# Patient Record
Sex: MALE | Race: WHITE | HISPANIC OR LATINO | Employment: FULL TIME | URBAN - METROPOLITAN AREA
[De-identification: names, ages, dates, MRNs, and addresses within clinical notes are randomized per-mention and may not be internally consistent; named-entity substitution may affect disease eponyms.]

---

## 2024-06-27 ENCOUNTER — OFFICE VISIT (OUTPATIENT)
Dept: CARDIOLOGY | Facility: CLINIC | Age: 39
End: 2024-06-27
Payer: COMMERCIAL

## 2024-06-27 VITALS
BODY MASS INDEX: 26.58 KG/M2 | SYSTOLIC BLOOD PRESSURE: 122 MMHG | DIASTOLIC BLOOD PRESSURE: 84 MMHG | WEIGHT: 175.4 LBS | HEART RATE: 72 BPM | HEIGHT: 68 IN

## 2024-06-27 DIAGNOSIS — Z86.79 HISTORY OF CARDIOMYOPATHY: ICD-10-CM

## 2024-06-27 DIAGNOSIS — Z13.220 SCREENING FOR HYPERLIPIDEMIA: ICD-10-CM

## 2024-06-27 DIAGNOSIS — Z86.79 HISTORY OF HYPERTENSION: Primary | ICD-10-CM

## 2024-06-27 PROCEDURE — 93000 ELECTROCARDIOGRAM COMPLETE: CPT | Performed by: INTERNAL MEDICINE

## 2024-06-27 PROCEDURE — 99205 OFFICE O/P NEW HI 60 MIN: CPT | Performed by: INTERNAL MEDICINE

## 2024-06-27 RX ORDER — LOSARTAN POTASSIUM 50 MG/1
50 TABLET ORAL DAILY
COMMUNITY
End: 2024-06-27

## 2024-06-27 NOTE — PROGRESS NOTES
SERVICE DATE: June 27, 2024    REFERRING PROVIDER:  No referring provider defined for this encounter.    PRIMARY CARE PROVIDER:  No primary care provider on file.  No primary provider on file.    REASON FOR VISIT:  History of hypertension and history of cardiomyopathy at age 26 years in [facility].    HISTORY OF PRESENT ILLNESS:  Guillermo Reyes Diaz is a Croatian-speaking 39 year old male, accompanied by his wife today.  Consultation was via a formal .  Patient is originally from [facility], has been in the [place] for [place], previously did not have health insurance, recently obtained health insurance and is here to establish care due to a prior cardiac history.  He does not currently have a primary care provider.    He says that when he was in [facility], at age 26 years, he was diagnosed with congestive heart failure and hypertension.  He was morbidly obese at the time.  Over the last few years, he has gone on the keto diet (high in saturated fat and salt per patient), has lost close to 60 pounds in weight, and has been asymptomatic for several years.  No chest pain, shortness of breath, lower extremity edema.  Works out regularly.  He has not been taking his losartan 50 mg for 2 months.  No tobacco use.  Family history is pertinent for hypertension and hyperlipidemia in his father.  No family history of coronary artery disease or cardiomyopathy as far as the patient knows but his family members do not get regular medical care.    Blood pressure today is excellent at 122/84, pulse of 72 bpm.  He weighs 175 pounds, BMI is 26.  Cardiac auscultation is normal with regular heart sounds, normal apical impulse, normal JVP, no murmur, lungs are clear, no lower extremity edema.    ECG done today shows normal sinus rhythm, normal cardiac intervals, QTc 405 ms, no left ventricular hypertrophy by voltage criteria, no ischemia or infarct.    Patient has no labs or cardiac imaging in the EMR either  here or in Care Everywhere.  He has not seen any provider in the [place] for 3 years.    DIAGNOSES/ASSESSMENT:  History of hypertension with normalization of blood pressure after significant weight loss and correction of morbid obesity through lifestyle modification by patient.  He is normotensive today.  No indication for him to take losartan.  He will need follow-up testing.  History of left ventricular systolic cardiomyopathy diagnosed in [facility] at the time of untreated hypertension at age 26 years.  Needs up-to-date echocardiogram and labs.  Family history of hyperlipidemia.  Needs screening lipid panel.    PLAN:  Detailed discussion via  with patient.  Reiterated the importance of dietary modification to decrease saturated fat and sodium which is currently part of his keto type diet which she has been doing for a few years.  I talked him about the DASH diet which entails increase natural foods like fruit and vegetables, whole grains, low-fat dairy, minimizing alcohol, drinking adequate water, 1-2 servings of caffeine daily.  Transthoracic echocardiogram.  Fasting labs (lipid panel, CBC, CMP) and follow-up with cardiology DAMARIS.  I have done a referral to primary care and urged him to establish care.  If his cardiac testing is unremarkable, he will be discharged from the cardiology clinic.      Blas Ibarra MD      New patient.  60 minutes spent by me on the date of the encounter doing chart review, history and exam, documentation and further activities per the note      The longitudinal plan of care for the condition(s) below were addressed during this visit. Due to the added complexity in care, I will continue to support Pernell in the subsequent management of this condition(s) and with the ongoing continuity of care of this condition(s).    Problem List Items Addressed This Visit as of 6/27/2024   History of hypertension and cardiomyopathy.       This note was completed in part  "using dictation via the Dragon voice recognition software. Some word and grammatical errors may occur and must be interpreted in the appropriate clinical context. If there are any questions pertaining to this issue, please contact me for further clarification.       Vitals: /84   Pulse 72   Ht 1.727 m (5' 8\")   Wt 79.6 kg (175 lb 6.4 oz)   BMI 26.67 kg/m    Wt Readings from Last 5 Encounters:   06/27/24 79.6 kg (175 lb 6.4 oz)         Orders Placed This Encounter   Procedures    Comprehensive metabolic panel    Lipid Profile    Primary Care Referral    Follow-Up with Cardiology DAMARIS    EKG 12-lead complete w/read - Clinics (performed today)    Echocardiogram Complete    CBC with platelets differential           CURRENT MEDICATIONS:  No current outpatient medications on file.         ALLERGIES:  No Known Allergies  "

## 2024-08-22 SDOH — HEALTH STABILITY: PHYSICAL HEALTH: ON AVERAGE, HOW MANY DAYS PER WEEK DO YOU ENGAGE IN MODERATE TO STRENUOUS EXERCISE (LIKE A BRISK WALK)?: 3 DAYS

## 2024-08-22 SDOH — HEALTH STABILITY: PHYSICAL HEALTH: ON AVERAGE, HOW MANY MINUTES DO YOU ENGAGE IN EXERCISE AT THIS LEVEL?: 60 MIN

## 2024-08-22 ASSESSMENT — SOCIAL DETERMINANTS OF HEALTH (SDOH): HOW OFTEN DO YOU GET TOGETHER WITH FRIENDS OR RELATIVES?: MORE THAN THREE TIMES A WEEK

## 2024-08-27 ENCOUNTER — OFFICE VISIT (OUTPATIENT)
Dept: PEDIATRICS | Facility: CLINIC | Age: 39
End: 2024-08-27
Payer: COMMERCIAL

## 2024-08-27 VITALS
BODY MASS INDEX: 26.69 KG/M2 | WEIGHT: 176.1 LBS | TEMPERATURE: 97.2 F | HEART RATE: 70 BPM | DIASTOLIC BLOOD PRESSURE: 75 MMHG | HEIGHT: 68 IN | OXYGEN SATURATION: 100 % | RESPIRATION RATE: 20 BRPM | SYSTOLIC BLOOD PRESSURE: 115 MMHG

## 2024-08-27 DIAGNOSIS — Z00.00 ROUTINE GENERAL MEDICAL EXAMINATION AT A HEALTH CARE FACILITY: Primary | ICD-10-CM

## 2024-08-27 DIAGNOSIS — Z11.59 NEED FOR HEPATITIS C SCREENING TEST: ICD-10-CM

## 2024-08-27 DIAGNOSIS — Z11.4 SCREENING FOR HIV (HUMAN IMMUNODEFICIENCY VIRUS): ICD-10-CM

## 2024-08-27 PROCEDURE — 99385 PREV VISIT NEW AGE 18-39: CPT | Performed by: STUDENT IN AN ORGANIZED HEALTH CARE EDUCATION/TRAINING PROGRAM

## 2024-08-27 ASSESSMENT — PAIN SCALES - GENERAL: PAINLEVEL: NO PAIN (0)

## 2024-08-27 NOTE — PATIENT INSTRUCTIONS
"Patient Education   Consejos de atención preventiva  Se trata de consejos generales que solemos ofrecer para ayudar a las personas a mantenerse saludables. [place] equipo de atención puede darle consejos específicos. Hable con ellos sobre rosalva propias necesidades de atención preventiva.  Estilo de robbie  Ejercítese, barron mínimo, 150 minutos a la semana (30 minutos al día, 5 días a la semana).  Realice actividades de fortalecimiento muscular 2 días a la semana, ya que contribuyen al control del peso y a la prevención de enfermedades.  No fume.  Use protector solar para prevenir el cáncer de piel.  Cada 2 a 5 años, realice pruebas de detección de radón en conway hogar. Se trata de un gas incoloro e inodoro que puede dañar los pulmones. Para obtener más información, visite www.health.[email or web address].[place] y busque \"Radon in Homes\" (Radón en los hogares).  Mantenga las sofia descargadas y bajo llave en un lugar seguro, barron akilah caja billy o akilah cámara blindada, o use un candado para sofia y esconda las llaves. Guarde siempre las balas por separado. Para obtener más información, visite [place] y busque \"Safe Gun Storage\" (Almacenamiento seguro leilani).  Alimentación  Consuma 5 o más porciones de frutas y verduras al día.  Pruebe el pan de glenna, el arroz integral y la pasta integral (en lugar de pan fischer, arroz fischer y pasta).  Consuma suficiente calcio y vitamina D. Revise la etiqueta de los alimentos y procure alcanzar el 100 % de la ingesta diaria recomendada (IDR).  Exámenes periódicos  Hágase un examen dental y akilah limpieza cada 6 meses.  Visite a conway equipo de atención médica todos los años para hablar sobre lo siguiente:  Todo cambio en conway marcelino.  Todo medicamento que conway equipo de atención le haya recetado.  Atención preventiva, planificación familiar y formas de prevenir enfermedades crónicas.  Inyecciones (vacunas)   Vacunas contra el virus del papiloma humano (VPH) (hasta los 26 años), si nunca se las lin colocado.  Vacunas " Start your first pill on the first Sunday after your period starts. For example, if you get your period on Tuesday, start on the following Sunday. If you get your period on Saturday, start the next day. If you get your period on Sunday, start the same day.     Take one pill daily at around the same time every day. You are not protected from getting pregnant until you have been on the pill for a full cycle.     If you miss one pill, you can take two pills the next day and still be protected from getting pregnant. You may experience some bleeding and spotting in the middle of the cycle however.     If you miss more than one pill, just continue to take the pills through the rest of the cycle, however, you will not be safe from getting pregnant until you have been through a full cycle of pills again.     It is not uncommon to experience some spotting or bleeding in the middle of the cycle in first few months while you are taking the pill. This is normal and typically goes away within 3 months. If this persists, please contact me.     The birth control pill does not protect against sexually transmitted diseases - you must use condoms to prevent STIs.        contra la hepatitis B (hasta los 59 años), si nunca se las lin colocado.  Vacuna contra la COVID-19: vacúnese cuando corresponda.  Vacuna contra la gripe: vacúnese contra la gripe todos los años.  Vacuna contra el tétanos: vacúnese contra el tétanos cada 10 años.  Vacunas contra el neumococo, la hepatitis A y el virus sincicial respiratorio (VSR): pregunte a conway equipo de atención si las necesita en función de conway riesgo.  Vacuna contra el herpes zóster (para personas de 50 años o más).  Pruebas médicas generales  Examen de detección de diabetes:  A partir de los 35 años, hágase exámenes de detección de diabetes, barron mínimo, cada 3 años.  Si tiene menos de 35 años, pregunte a conway equipo de atención si debe hacerlo.  Prueba de colesterol: a los 39 años, comience a hacerse akilah prueba de colesterol cada 5 años o con mayor frecuencia si se lo aconsejan.  Exploración de densidad ósea (DEXA): a los 50 años, pregunte a conway equipo de atención si debe hacerse esta exploración para detectar osteoporosis (fragilidad en los huesos).  Hepatitis C: hágase la prueba, barron mínimo, akilah vez en la robbie.  Examen de detección de aneurismas aórticos abdominales: hable con conway médico sobre la posibilidad de hacerse ifeanyi examen de detección si cumple alguna de las siguientes condiciones:  fumó alguna vez;  y  es hombre según conway sexo biológico;  y  tiene entre 65 y 75 años.  Infecciones de transmisión sexual (ITS)  Antes de los 24 años, pregunte a conway equipo de atención si debe hacerse exámenes de detección de ITS.  Después de los 24 años, hágase exámenes de detección de ITS si está en riesgo. Está en riesgo de contraer alguna ITS (incluido el virus de la inmunodeficiencia adquirida [VIH]) en los siguientes casos:  Tiene relaciones sexuales con más de akilah persona.  No usa preservativos siempre que tiene relaciones sexuales.  A usted o a conway frank le diagnosticaron akilah infección de transmisión sexual.  Si está en riesgo de contraer el VIH,  consulte sobre los medicamentos de la profilaxis de preexposición (Pre-Exposure Prophylaxis, PrEP) para prevenirlo.  Hágase la prueba del VIH, barron mínimo, akilah vez en la robbie, tanto si está en riesgo de contraer el virus barron si no.  Pruebas de detección de cáncer  Examen de detección de cáncer de kj uterino: si tiene kj uterino, comience a hacerse pruebas periódicas de detección de cáncer de kj uterino a los 21 años. La mayoría de las personas que se hacen exámenes periódicos de detección y obtienen resultados normales pueden dejar de hacerlo a partir de los 65 años. Hable sobre esto con conway proveedor.  Exploración de detección de cáncer de mama (mamografía): si alguna vez ha tenido mamas, comience a hacerse mamografías periódicas a partir de los 40 años. Se trata de akilah exploración para detectar el cáncer de mama.  Examen de detección de cáncer de colon: es importante comenzar a hacerse los exámenes de detección de cáncer de colon a los 45 años.  Hágase akilah colonoscopía cada 10 años (o con más frecuencia si está en riesgo) O pregunte a conway proveedor sobre pruebas de heces, barron la prueba inmunoquímica fecal (Fecal Immunochemical Test, FIT) cada año o la prueba Cologuard cada 3 años.  Para obtener más información sobre las opciones de las pruebas que tiene a disposición, visite: www.fvfiles.com/296856gr.  Si necesita ayuda para jordi akilah decisión, visite: [email or web address].  Prueba de detección de cáncer de próstata: si tiene próstata y está entre los 55 y 69 años, pregunte a conway proveedor si le convendría hacerse akilah prueba anual de detección de cáncer de próstata.  Examen de detección de cáncer de pulmón: si fuma o fumó y tiene entre 50 y 80 años, pregunte a conway equipo de atención si los exámenes continuos de detección de cáncer de pulmón son adecuados para usted.    Solo para uso con fines informativos. Agnes documento no pretende sustituir ninguna recomendación médica. Derechos de autor   2023 [facility].   Todos los derechos reservados. Revisión clínica a [facility] de [facility] Transitions Program. Canpages [id number] - REV 04/24.

## 2024-08-27 NOTE — PROGRESS NOTES
"Preventive Care Visit  [facility] JACLYN Mc MD, Internal Medicine  Aug 27, 2024      Assessment & Plan     Routine general medical examination at a health care facility  Presents today for routine visit and to establish care. He and family moved here for [facility] in 2021.  While in [facility] he was told he has LVH and hypertension which has resolved with weight loss through the keto diet.  He was seen by cardiology and has follow up scheduled with labs and an echo in October.  Blood pressure within the normal range today     Screening for HIV (human immunodeficiency virus)  No additional risk factors will complete with labwork in October  - HIV Antigen Antibody Combo; Future    Need for hepatitis C screening test  Will complete with scheduled fasting labs in October  - Hepatitis C Screen Reflex to HCV RNA Quant and Genotype; Future            BMI  Estimated body mass index is 27 kg/m  as calculated from the following:    Height as of this encounter: 1.72 m (5' 7.72\").    Weight as of this encounter: 79.9 kg (176 lb 1.6 oz).       Counseling  Appropriate preventive services were addressed with this patient via screening, questionnaire, or discussion as appropriate for fall prevention, nutrition, physical activity, Tobacco-use cessation, social engagement, weight loss and cognition.  Checklist reviewing preventive services available has been given to the patient.  Reviewed patient's diet, addressing concerns and/or questions.   He is at risk for lack of exercise and has been provided with information to increase physical activity for the benefit of his well-being.           Db Gimenez is a 39 year old, presenting for the following:  Physical        8/27/2024     9:28 AM   Additional Questions   Roomed by Lisa IBARRA   Accompanied by Spouse, Khushi         8/27/2024     9:28 AM   Patient Reported Additional Medications   Patient reports taking the following new medications No "        Health Care Directive  Patient does not have a Health Care Directive or Living Will:     HPI  Moved from [facility] in 2021.     Notes bilateral knee pain with exercise as well as shoulder pain with certain positions when sleeping overnight . No associated numbness or weakness.                8/22/2024   General Health   How would you rate your overall physical health? Good   Feel stress (tense, anxious, or unable to sleep) Not at all            8/22/2024   Nutrition   Three or more servings of calcium each day? Yes   Diet: Other   If other, please elaborate: Keto diet   How many servings of fruit and vegetables per day? (!) 2-3   How many sweetened beverages each day? 0-1            8/22/2024   Exercise   Days per week of moderate/strenous exercise 3 days   Average minutes spent exercising at this level 60 min    - goes to the gym not much cardio.  Usually goes 4 times per week         8/22/2024   Social Factors   Frequency of gathering with friends or relatives More than three times a week   Worry food won't last until get money to buy more No   Food not last or not have enough money for food? No   Do you have housing? (Housing is defined as stable permanent housing and does not include staying ouside in a car, in a tent, in an abandoned building, in an overnight shelter, or couch-surfing.) Yes   Are you worried about losing your housing? No   Lack of transportation? No   Unable to get utilities (heat,electricity)? No            8/22/2024   Dental   Dentist two times every year? Yes            8/22/2024   TB Screening   Were you born outside of the [place]? No              Today's PHQ-2 Score:       6/27/2024     9:26 AM   PHQ-2 ( 1999 [employer])   Q1: Little interest or pleasure in doing things 0   Q2: Feeling down, depressed or hopeless 0   PHQ-2 Score 0         8/22/2024   Substance Use   Alcohol more than 3/day or more than 7/wk No   Do you use any other substances recreationally? No        Social History  "    Tobacco Use    Smoking status: Former     Current packs/day: 0.50     Average packs/day: 0.5 packs/day for 22.7 years (11.3 ttl pk-yrs)     Types: Cigarettes     Start date: 2002    Smokeless tobacco: Never   Vaping Use    Vaping status: Never Used   Substance Use Topics    Alcohol use: Yes     Comment: socially/occasionally             8/22/2024   One time HIV Screening   Previous HIV test? No          8/22/2024   STI Screening   New sexual partner(s) since last STI/HIV test? No            8/22/2024   Contraception/Family Planning   Questions about contraception or family planning No           Reviewed and updated as needed this visit by Provider                             Objective    Exam  /75 (BP Location: Right arm, Patient Position: Sitting, Cuff Size: Adult Large)   Pulse 70   Temp 97.2  F (36.2  C) (Tympanic)   Resp 20   Ht 1.72 m (5' 7.72\")   Wt 79.9 kg (176 lb 1.6 oz)   SpO2 100%   BMI 27.00 kg/m     Estimated body mass index is 27 kg/m  as calculated from the following:    Height as of this encounter: 1.72 m (5' 7.72\").    Weight as of this encounter: 79.9 kg (176 lb 1.6 oz).    Physical Exam  GENERAL: alert and no distress  EYES: Eyes grossly normal to inspection, PERRL and conjunctivae and sclerae normal  HENT: ear canals and TM's normal, nose and mouth without ulcers or lesions  NECK: no adenopathy, no asymmetry, masses, or scars  RESP: lungs clear to auscultation - no rales, rhonchi or wheezes  CV: regular rate and rhythm, normal S1 S2, no S3 or S4, no murmur, click or rub, no peripheral edema  ABDOMEN: soft, nontender, no hepatosplenomegaly, no masses  MS: no gross musculoskeletal defects noted, no edema  SKIN: no suspicious lesions or rashes  NEURO: Normal strength and tone, mentation intact and speech normal  PSYCH: mentation appears normal, affect normal/bright        Signed Electronically by: Margaret Mc MD    "

## 2024-10-01 ENCOUNTER — HOSPITAL ENCOUNTER (OUTPATIENT)
Dept: CARDIOLOGY | Facility: CLINIC | Age: 39
Discharge: HOME | End: 2024-10-01
Attending: INTERNAL MEDICINE | Admitting: INTERNAL MEDICINE
Payer: COMMERCIAL

## 2024-10-01 ENCOUNTER — LAB (OUTPATIENT)
Dept: LAB | Facility: CLINIC | Age: 39
End: 2024-10-01
Payer: COMMERCIAL

## 2024-10-01 DIAGNOSIS — Z13.220 SCREENING FOR HYPERLIPIDEMIA: ICD-10-CM

## 2024-10-01 DIAGNOSIS — Z86.79 HISTORY OF CARDIOMYOPATHY: ICD-10-CM

## 2024-10-01 DIAGNOSIS — Z86.79 HISTORY OF HYPERTENSION: ICD-10-CM

## 2024-10-01 LAB
ALBUMIN SERPL BCG-MCNC: 4.3 G/DL (ref 3.5–5.2)
ALP SERPL-CCNC: 58 U/L (ref 40–150)
ALT SERPL W P-5'-P-CCNC: 19 U/L (ref 0–70)
ANION GAP SERPL CALCULATED.3IONS-SCNC: 11 MMOL/L (ref 7–15)
AST SERPL W P-5'-P-CCNC: 26 U/L (ref 0–45)
BASOPHILS # BLD AUTO: 0 10E3/UL (ref 0–0.2)
BASOPHILS NFR BLD AUTO: 0 %
BI-PLANE LVEF ECHO: NORMAL
BILIRUB SERPL-MCNC: 0.7 MG/DL
BUN SERPL-MCNC: 13.8 MG/DL (ref 6–20)
CALCIUM SERPL-MCNC: 9.1 MG/DL (ref 8.8–10.4)
CHLORIDE SERPL-SCNC: 104 MMOL/L (ref 98–107)
CHOLEST SERPL-MCNC: 233 MG/DL
CREAT SERPL-MCNC: 0.73 MG/DL (ref 0.67–1.17)
EGFRCR SERPLBLD CKD-EPI 2021: >90 ML/MIN/1.73M2
EOSINOPHIL # BLD AUTO: 0.3 10E3/UL (ref 0–0.7)
EOSINOPHIL NFR BLD AUTO: 6 %
ERYTHROCYTE [DISTWIDTH] IN BLOOD BY AUTOMATED COUNT: 14.8 % (ref 10–15)
FASTING STATUS PATIENT QL REPORTED: YES
GLUCOSE SERPL-MCNC: 98 MG/DL (ref 70–99)
HCO3 SERPL-SCNC: 25 MMOL/L (ref 22–29)
HCT VFR BLD AUTO: 46.3 % (ref 40–53)
HDLC SERPL-MCNC: 57 MG/DL
HGB BLD-MCNC: 14.9 G/DL (ref 13.3–17.7)
IMM GRANULOCYTES # BLD: 0 10E3/UL
IMM GRANULOCYTES NFR BLD: 0 %
LDLC SERPL CALC-MCNC: 165 MG/DL
LVEF ECHO: NORMAL
LYMPHOCYTES # BLD AUTO: 1.7 10E3/UL (ref 0.8–5.3)
LYMPHOCYTES NFR BLD AUTO: 33 %
MCH RBC QN AUTO: 27.6 PG (ref 26.5–33)
MCHC RBC AUTO-ENTMCNC: 32.2 G/DL (ref 31.5–36.5)
MCV RBC AUTO: 86 FL (ref 78–100)
MONOCYTES # BLD AUTO: 0.4 10E3/UL (ref 0–1.3)
MONOCYTES NFR BLD AUTO: 8 %
NEUTROPHILS # BLD AUTO: 2.7 10E3/UL (ref 1.6–8.3)
NEUTROPHILS NFR BLD AUTO: 52 %
NONHDLC SERPL-MCNC: 176 MG/DL
NRBC # BLD AUTO: 0 10E3/UL
NRBC BLD AUTO-RTO: 0 /100
PLATELET # BLD AUTO: 256 10E3/UL (ref 150–450)
POTASSIUM SERPL-SCNC: 4.7 MMOL/L (ref 3.4–5.3)
PROT SERPL-MCNC: 7.2 G/DL (ref 6.4–8.3)
RBC # BLD AUTO: 5.4 10E6/UL (ref 4.4–5.9)
SODIUM SERPL-SCNC: 140 MMOL/L (ref 135–145)
TRIGL SERPL-MCNC: 57 MG/DL
WBC # BLD AUTO: 5.2 10E3/UL (ref 4–11)

## 2024-10-01 PROCEDURE — 36415 COLL VENOUS BLD VENIPUNCTURE: CPT | Performed by: INTERNAL MEDICINE

## 2024-10-01 PROCEDURE — 85025 COMPLETE CBC W/AUTO DIFF WBC: CPT | Performed by: INTERNAL MEDICINE

## 2024-10-01 PROCEDURE — 93306 TTE W/DOPPLER COMPLETE: CPT

## 2024-10-01 PROCEDURE — 93306 TTE W/DOPPLER COMPLETE: CPT | Mod: 26 | Performed by: INTERNAL MEDICINE

## 2024-10-01 PROCEDURE — 80061 LIPID PANEL: CPT | Performed by: INTERNAL MEDICINE

## 2024-10-01 PROCEDURE — 80053 COMPREHEN METABOLIC PANEL: CPT | Performed by: INTERNAL MEDICINE

## 2024-12-15 NOTE — PROGRESS NOTES
CARDIOLOGY CLINIC NOTE    PRIMARY CARDIOLOGIST  Dr. Ibarra     PRIMARY CARE PHYSICIAN:  Margaret Mc    HISTORY OF PRESENT ILLNESS:  Guillermo Reyes Diaz is a 39-year-old Bengali-speaking male from [facility] with a past medical history significant for hypertension, cardiomyopathy, and family history of premature CAD (father)     He was seen on 6/27/2024 to establish care with Dr. Ibarra.  He states he was diagnosed with congestive heart failure while in [facility].  He also states he was morbidly obese up until a few years ago when he went on the keto diet and lost close to 60 pounds.  He elected to stop losartan.  Blood pressures have remained well-controlled without medical therapy.    Echocardiogram on 10/1/2024 showed a mildly reduced ejection fraction estimated at 45 to 50%, biplane 48%.  There was mild global hypokinesis of the LV.  The RV was mildly dilated with normal RV systolic function and trace to mild mitral and tricuspid regurgitation.    Labs in October showed a normal CBC and CMP  Lipid panel showed a total cholesterol of 233, triglycerides 57, HDL 57 and     He returns to the office today for 6-month follow-up.  Today's visit is with the assistance of a Bengali phone .  He does not endorse any cardiac symptoms including chest pain, shortness of breath, palpitations, PND, orthopnea, presyncope, syncope or edema.    Blood pressure is well-controlled at 128/82 on no antihypertensive agents.    He continues to workout regularly.  Weight is stable at 178 pounds.  He follows a keto diet    PAST MEDICAL HISTORY:  Past Medical History:   Diagnosis Date    HTN (hypertension)     Left ventricular hypertrophy     noted on prior echo in [facility]       MEDICATIONS:  Current Outpatient Medications   Medication Sig Dispense Refill    atorvastatin (LIPITOR) 10 MG tablet Take 1 tablet (10 mg) by mouth daily. 90 tablet 3    losartan (COZAAR) 25 MG tablet Take 0.5 tablets (12.5 mg) by mouth daily.  45 tablet 1     No current facility-administered medications for this visit.       SOCIAL HISTORY:  I have reviewed this patient's social history and updated it with pertinent information if needed. Guillermo A Reyes Diaz  reports that he quit smoking about 17 years ago. His smoking use included cigarettes. He started smoking about 21 years ago. He has a 2 pack-year smoking history. He has never used smokeless tobacco. He reports current alcohol use. He reports that he does not use drugs.    PHYSICAL EXAM:  Pulse:  [62] 62  BP: (128)/(82) 128/82  SpO2:  [96 %] 96 %  178 lbs 14.4 oz    Constitutional: alert, no distress  Respiratory: Good bilateral air entry  Cardiovascular: Regular rate and rhythm  GI: nondistended  Neuropsychiatric: appropriate affact    ASSESSMENT/PLAN:  Pertinent issues addressed/ reviewed during this cardiology visit  Hypertension -well-controlled  Cardiomyopathy - Echocardiogram on 10/1/2024 showed a mildly reduced ejection fraction estimated at 45 to 50%, biplane 48%.  There was mild global hypokinesis of the LV.  The RV was mildly dilated with normal RV systolic function and trace to mild mitral and tricuspid regurgitation.  Will start low-dose losartan 12.5 mg daily in an attempt to improve heart function.  Follow-up BMP in 2 weeks.  Continue to optimize medication with follow-up limited echocardiogram in approximately 3 months.    3.  Hyperlipidemia -LDL not at goal 165.  In the setting of risk factors including hypertension and family history of heart disease I recommend a low-dose atorvastatin 10 mg daily. Follow up fasting lipid panel in 6 weeks.     Follow up in 6 weeks with DAMARIS.     It was a pleasure seeing this patient in clinic today. Please do not hesitate to contact me with any future questions.     KRISHAN Garcia, CNP  Cardiology - [facility]  12/16/2024       The level of medical decision making during this visit was of moderate complexity.    This note was completed in  part using dictation via the Dragon voice recognition software. Some word and grammatical errors may occur and must be interpreted in the appropriate clinical context.  If there are any questions pertaining to this issue, please contact me for further clarification.

## 2024-12-16 ENCOUNTER — OFFICE VISIT (OUTPATIENT)
Dept: CARDIOLOGY | Facility: CLINIC | Age: 39
End: 2024-12-16
Attending: INTERNAL MEDICINE
Payer: COMMERCIAL

## 2024-12-16 VITALS
HEIGHT: 68 IN | BODY MASS INDEX: 27.11 KG/M2 | WEIGHT: 178.9 LBS | SYSTOLIC BLOOD PRESSURE: 128 MMHG | OXYGEN SATURATION: 96 % | HEART RATE: 62 BPM | DIASTOLIC BLOOD PRESSURE: 82 MMHG

## 2024-12-16 DIAGNOSIS — Z86.79 HISTORY OF HYPERTENSION: Primary | ICD-10-CM

## 2024-12-16 DIAGNOSIS — Z86.79 HISTORY OF CARDIOMYOPATHY: ICD-10-CM

## 2024-12-16 DIAGNOSIS — E78.5 HYPERLIPIDEMIA LDL GOAL <70: ICD-10-CM

## 2024-12-16 RX ORDER — ATORVASTATIN CALCIUM 10 MG/1
10 TABLET, FILM COATED ORAL DAILY
Qty: 90 TABLET | Refills: 3 | Status: SHIPPED | OUTPATIENT
Start: 2024-12-16

## 2024-12-16 RX ORDER — LOSARTAN POTASSIUM 25 MG/1
12.5 TABLET ORAL DAILY
Qty: 45 TABLET | Refills: 1 | Status: SHIPPED | OUTPATIENT
Start: 2024-12-16

## 2024-12-16 NOTE — LETTER
12/16/2024    Margaret Mc MD  [address] [facility] [address]    RE: Guillermo A Reyes Diaz       Dear Colleague,     I had the pleasure of seeing Guillermo A Reyes Diaz in the [facility].  CARDIOLOGY CLINIC NOTE    PRIMARY CARDIOLOGIST  Dr. Ibarra     PRIMARY CARE PHYSICIAN:  Margaret Mc    HISTORY OF PRESENT ILLNESS:  Guillermo Reyes Diaz is a 39-year-old Irish-speaking male from [facility] with a past medical history significant for hypertension, cardiomyopathy, and family history of premature CAD (father)     He was seen on 6/27/2024 to establish care with Dr. Ibarra.  He states he was diagnosed with congestive heart failure while in [facility].  He also states he was morbidly obese up until a few years ago when he went on the keto diet and lost close to 60 pounds.  He elected to stop losartan.  Blood pressures have remained well-controlled without medical therapy.    Echocardiogram on 10/1/2024 showed a mildly reduced ejection fraction estimated at 45 to 50%, biplane 48%.  There was mild global hypokinesis of the LV.  The RV was mildly dilated with normal RV systolic function and trace to mild mitral and tricuspid regurgitation.    Labs in October showed a normal CBC and CMP  Lipid panel showed a total cholesterol of 233, triglycerides 57, HDL 57 and     He returns to the office today for 6-month follow-up.  Today's visit is with the assistance of a Irish phone .  He does not endorse any cardiac symptoms including chest pain, shortness of breath, palpitations, PND, orthopnea, presyncope, syncope or edema.    Blood pressure is well-controlled at 128/82 on no antihypertensive agents.    He continues to workout regularly.  Weight is stable at 178 pounds.  He follows a keto diet    PAST MEDICAL HISTORY:  Past Medical History:   Diagnosis Date     HTN (hypertension)      Left ventricular hypertrophy     noted on prior echo in [facility]        MEDICATIONS:  Current Outpatient Medications   Medication Sig Dispense Refill     atorvastatin (LIPITOR) 10 MG tablet Take 1 tablet (10 mg) by mouth daily. 90 tablet 3     losartan (COZAAR) 25 MG tablet Take 0.5 tablets (12.5 mg) by mouth daily. 45 tablet 1     No current facility-administered medications for this visit.       SOCIAL HISTORY:  I have reviewed this patient's social history and updated it with pertinent information if needed. Guillermo A Reyes Diaz  reports that he quit smoking about 17 years ago. His smoking use included cigarettes. He started smoking about 21 years ago. He has a 2 pack-year smoking history. He has never used smokeless tobacco. He reports current alcohol use. He reports that he does not use drugs.    PHYSICAL EXAM:  Pulse:  [62] 62  BP: (128)/(82) 128/82  SpO2:  [96 %] 96 %  178 lbs 14.4 oz    Constitutional: alert, no distress  Respiratory: Good bilateral air entry  Cardiovascular: Regular rate and rhythm  GI: nondistended  Neuropsychiatric: appropriate affact    ASSESSMENT/PLAN:  Pertinent issues addressed/ reviewed during this cardiology visit  Hypertension -well-controlled  Cardiomyopathy - Echocardiogram on 10/1/2024 showed a mildly reduced ejection fraction estimated at 45 to 50%, biplane 48%.  There was mild global hypokinesis of the LV.  The RV was mildly dilated with normal RV systolic function and trace to mild mitral and tricuspid regurgitation.  Will start low-dose losartan 12.5 mg daily in an attempt to improve heart function.  Follow-up BMP in 2 weeks.  Continue to optimize medication with follow-up limited echocardiogram in approximately 3 months.    3.  Hyperlipidemia -LDL not at goal 165.  In the setting of risk factors including hypertension and family history of heart disease I recommend a low-dose atorvastatin 10 mg daily. Follow up fasting lipid panel in 6 weeks.     Follow up in 6 weeks with DAMARIS.     It was a pleasure seeing this patient in clinic today.  Please do not hesitate to contact me with any future questions.     KRISHAN Garcia, CNP  Cardiology - [facility]  12/16/2024       The level of medical decision making during this visit was of moderate complexity.    This note was completed in part using dictation via the Dragon voice recognition software. Some word and grammatical errors may occur and must be interpreted in the appropriate clinical context.  If there are any questions pertaining to this issue, please contact me for further clarification.      Thank you for allowing me to participate in the care of your patient.      Sincerely,     KRISHAN Garcia [facility]  cc:   Blas Ibarra MD  [address] [place] [id number]

## 2024-12-16 NOTE — PATIENT INSTRUCTIONS
Thanks for participating in a office visit with the [facility] clinic today.    Reviewed results of echocardiogram showing a mild reduction in the squeezing motion of the heart 45-50% ( normal 55-65%)  To help improve heart function, will start low dose losartan 12.5 mg daily   Follow up BMP in 2 weeks.   Reviewed results of cholesterol level showing LDL is not at goal.   Recommend a low dose atorvastatin 10 mg daily, monitor for joint aches.   Follow up fasting cholesterol level in 6 weeks  Encourage continued exercise and more mediterranean diet.    Follow up in 6 weeks with DAMARIS      Please call my nurse at   [phone]. Call with any questions or concerns.    Scheduling phone number: [phone]  Reminder: Please bring in all current medications, over the counter supplements and vitamin bottles to your next appointment.

## 2025-01-21 ENCOUNTER — OFFICE VISIT (OUTPATIENT)
Dept: URGENT CARE | Facility: URGENT CARE | Age: 40
End: 2025-01-21
Payer: COMMERCIAL

## 2025-01-21 VITALS
HEART RATE: 65 BPM | BODY MASS INDEX: 26.91 KG/M2 | WEIGHT: 177 LBS | OXYGEN SATURATION: 100 % | RESPIRATION RATE: 18 BRPM | DIASTOLIC BLOOD PRESSURE: 79 MMHG | SYSTOLIC BLOOD PRESSURE: 135 MMHG | TEMPERATURE: 97.6 F

## 2025-01-21 DIAGNOSIS — L03.213 PRESEPTAL CELLULITIS OF LEFT UPPER EYELID: Primary | ICD-10-CM

## 2025-01-21 PROCEDURE — 99214 OFFICE O/P EST MOD 30 MIN: CPT | Performed by: PHYSICIAN ASSISTANT

## 2025-01-21 RX ORDER — SULFAMETHOXAZOLE AND TRIMETHOPRIM 800; 160 MG/1; MG/1
1 TABLET ORAL 2 TIMES DAILY
Qty: 14 TABLET | Refills: 0 | Status: SHIPPED | OUTPATIENT
Start: 2025-01-21 | End: 2025-01-28

## 2025-01-21 RX ORDER — SACCHAROMYCES BOULARDII 250 MG
250 CAPSULE ORAL 2 TIMES DAILY
Qty: 28 CAPSULE | Refills: 0 | Status: SHIPPED | OUTPATIENT
Start: 2025-01-21 | End: 2025-02-04

## 2025-01-21 NOTE — PROGRESS NOTES
SUBJECTIVE:  Chief Complaint:   Chief Complaint   Patient presents with    Urgent Care     Left eye swelling, painful and itchy- since yesterday- has gotten worse today      History of Present Illness:  Guillermo A Reyes Diaz is a 39 year old male who presents complaining of mild left eye eyelid swelling for 2 day(s).   Onset/timing: gradual.    Associated Signs and Symptoms: none  Treatment measures tried include: none  Contact wearer : No    Past Medical History:   Diagnosis Date    HTN (hypertension)     Left ventricular hypertrophy     noted on prior echo in [facility]     Current Outpatient Medications   Medication Sig Dispense Refill    amoxicillin-clavulanate (AUGMENTIN) 875-125 MG tablet Take 1 tablet by mouth 2 times daily for 7 days. 14 tablet 0    atorvastatin (LIPITOR) 10 MG tablet Take 1 tablet (10 mg) by mouth daily. 90 tablet 3    losartan (COZAAR) 25 MG tablet Take 0.5 tablets (12.5 mg) by mouth daily. 45 tablet 1    saccharomyces boulardii (FLORASTOR) 250 MG capsule Take 1 capsule (250 mg) by mouth 2 times daily for 14 days. Take 2 hours after each Augmentin dose 28 capsule 0    sulfamethoxazole-trimethoprim (BACTRIM DS) 800-160 MG tablet Take 1 tablet by mouth 2 times daily for 7 days. 14 tablet 0        ROS:  Review of systems negative except as stated above.    OBJECTIVE:  /79   Pulse 65   Temp 97.6  F (36.4  C) (Tympanic)   Resp 18   Wt 80.3 kg (177 lb)   SpO2 100%   BMI 26.91 kg/m    General: no acute distress  Eye exam: right eye normal lid, conjunctiva, cornea, left eye abnormal findings: upper eyelid swollen, tender and erythematous.  Heart: NORMAL - regular rate and rhythm without murmur.  Lungs: normal and clear to auscultation    ASSESSMENT:  (L03.213) Preseptal cellulitis of left upper eyelid  (primary encounter diagnosis)  Plan: sulfamethoxazole-trimethoprim (BACTRIM DS)         800-160 MG tablet, amoxicillin-clavulanate         (AUGMENTIN) 875-125 MG tablet, saccharomyces          boulardii (FLORASTOR) 250 MG capsule     Cool compress  Elevate head of bed    Red flags and emergent follow up discussed, and understood by patient  Follow up with PCP if symptoms worsen or fail to improve

## 2025-03-19 ENCOUNTER — OFFICE VISIT (OUTPATIENT)
Dept: URGENT CARE | Facility: URGENT CARE | Age: 40
End: 2025-03-19
Payer: COMMERCIAL

## 2025-03-19 ENCOUNTER — ANCILLARY PROCEDURE (OUTPATIENT)
Dept: GENERAL RADIOLOGY | Facility: CLINIC | Age: 40
End: 2025-03-19
Attending: PHYSICIAN ASSISTANT
Payer: COMMERCIAL

## 2025-03-19 VITALS
SYSTOLIC BLOOD PRESSURE: 118 MMHG | OXYGEN SATURATION: 97 % | BODY MASS INDEX: 27.46 KG/M2 | RESPIRATION RATE: 18 BRPM | TEMPERATURE: 100.5 F | HEART RATE: 112 BPM | WEIGHT: 180.6 LBS | DIASTOLIC BLOOD PRESSURE: 67 MMHG

## 2025-03-19 DIAGNOSIS — R07.0 THROAT PAIN: ICD-10-CM

## 2025-03-19 DIAGNOSIS — R50.9 FEVER AND CHILLS: ICD-10-CM

## 2025-03-19 DIAGNOSIS — J10.1 INFLUENZA B: Primary | ICD-10-CM

## 2025-03-19 LAB
DEPRECATED S PYO AG THROAT QL EIA: NEGATIVE
FLUAV AG SPEC QL IA: NEGATIVE
FLUBV AG SPEC QL IA: POSITIVE
S PYO DNA THROAT QL NAA+PROBE: NOT DETECTED

## 2025-03-19 PROCEDURE — 87804 INFLUENZA ASSAY W/OPTIC: CPT | Performed by: PHYSICIAN ASSISTANT

## 2025-03-19 PROCEDURE — 71046 X-RAY EXAM CHEST 2 VIEWS: CPT | Mod: TC | Performed by: RADIOLOGY

## 2025-03-19 PROCEDURE — 99214 OFFICE O/P EST MOD 30 MIN: CPT | Performed by: PHYSICIAN ASSISTANT

## 2025-03-19 NOTE — PATIENT INSTRUCTIONS
You have influenza B  This can cause a fever for up to 5 days  Fluids and rest  Tylenol / Ibuprofen for comfort and fever     Return to the clinic if fever does not go away within two days, chest pain, shortness of breath, lightheadedness or dizziness, worsening symptoms etc

## 2025-03-19 NOTE — PROGRESS NOTES
Assessment & Plan     1. Influenza B (Primary)  On exam, lungs are CTAB without sign of respiratory distress. Throat without PTA or RPA and TM clear B/L. No nuchal rigidity or abd tenderness.    Strep testing is negative  Influenza B is positive. He is out of the window for any benefit with tamiflu  CXR is negative for acute abnormality, such as pneumonia, per my read.   Supportive cares encouraged  Follow-up if symptoms are not improving, fever unresolving, chest pain, SOB, worsening symptoms etc     - Influenza A & B Antigen - Clinic Collect  - XR Chest 2 Views; Future    2. Throat pain  - Streptococcus A Rapid Screen w/Reflex to PCR - Clinic Collect  - Group A Streptococcus PCR Throat Swab          Return in about 3 days (around 3/22/2025), or if symptoms worsen or fail to improve.    Diagnosis and treatment plan was reviewed with patient and/or family.   We went over any labs or imaging. Discussed worsening symptoms or little to no relief despite treatment plan to follow-up with PCP or return to clinic.  Patient verbalizes understanding. All questions were addressed and answered.     Shari Jones PA-C  [facility]    CHIEF COMPLAINT:   Chief Complaint   Patient presents with    Urgent Care     Pt has had a fever, congestion, cough, throat pain, headaches and body aches since Sunday.     Db Gimenez is a 40 year old male who presents to clinic today for evaluation of fever, congestion, cough, throat pain and headache. Symptoms started on Sunday and have been worsening.   No chest pain or SOB  Son with similar symptoms      Past Medical History:   Diagnosis Date    HTN (hypertension)     Left ventricular hypertrophy     noted on prior echo in [facility]     Past Surgical History:   Procedure Laterality Date    septal deviation       Social History     Tobacco Use    Smoking status: Former     Current packs/day: 0.00     Average packs/day: 0.5 packs/day for 4.0 years (2.0  ttl pk-yrs)     Types: Cigarettes     Start date:      Quit date:      Years since quittin.2    Smokeless tobacco: Never   Substance Use Topics    Alcohol use: Yes     Comment: socially/occasionally 2 times per month     Current Outpatient Medications   Medication Sig Dispense Refill    atorvastatin (LIPITOR) 10 MG tablet Take 1 tablet (10 mg) by mouth daily. (Patient not taking: Reported on 3/19/2025) 90 tablet 3    losartan (COZAAR) 25 MG tablet Take 0.5 tablets (12.5 mg) by mouth daily. (Patient not taking: Reported on 3/19/2025) 45 tablet 1     No current facility-administered medications for this visit.     No Known Allergies    10 point ROS of systems were all negative except for pertinent positives noted in my HPI.      Exam:   /67   Pulse 112   Temp 100.5  F (38.1  C) (Tympanic)   Resp 18   Wt 81.9 kg (180 lb 9.6 oz)   SpO2 97%   BMI 27.46 kg/m    Constitutional: alert and no distress  Head: Normocephalic, atraumatic.  Eyes: conjunctiva clear, no drainage  ENT: TMs clear and shiny negrito, nasal mucosa pink and moist, throat erythematous without trismus or drooling.   Neck: neck is supple, no cervical lymphadenopathy or nuchal rigidity  Cardiovascular: RRR  Respiratory: CTA bilaterally, no rhonchi or rales  Gastrointestinal: soft and nontender  Skin: no rashes  Neurologic: Speech clear, gait normal. Moves all extremities.    Results for orders placed or performed in visit on 25   XR Chest 2 Views     Status: None    Narrative    EXAM: XR CHEST 2 VIEWS  LOCATION: [facility]  DATE: 3/19/2025    INDICATION: Cough and fever x5 days.  COMPARISON: None.      Impression    IMPRESSION: Negative chest.   Results for orders placed or performed in visit on 25   Streptococcus A Rapid Screen w/Reflex to PCR - Clinic Collect     Status: Normal    Specimen: Throat; Swab   Result Value Ref Range    Group A Strep antigen Negative Negative   Influenza A & B Antigen - Clinic  Collect     Status: Abnormal    Specimen: Nose; Swab   Result Value Ref Range    Influenza A antigen Negative Negative    Influenza B antigen Positive (A) Negative    Narrative    Test results must be correlated with clinical data. If necessary, results should be confirmed by a molecular assay or viral culture.

## 2025-03-20 ENCOUNTER — TELEPHONE (OUTPATIENT)
Dept: CARDIOLOGY | Facility: CLINIC | Age: 40
End: 2025-03-20
Payer: COMMERCIAL

## 2025-03-20 NOTE — TELEPHONE ENCOUNTER
2nd attempt- Left voicemail for the patient to call back and schedule the following:    Appointment type:  Return Cardiology  Provider:  Kalyani Root  Return date:  next available  Additional appointment(s) needed:  Labs to be done a few days before appointment  Additional Notes:  NA  Specialty phone number: [phone]    Darling Monge on 3/20/2025 at 9:34 AM

## 2025-07-28 ENCOUNTER — PATIENT OUTREACH (OUTPATIENT)
Dept: CARE COORDINATION | Facility: CLINIC | Age: 40
End: 2025-07-28
Payer: COMMERCIAL